# Patient Record
Sex: FEMALE | Race: WHITE | NOT HISPANIC OR LATINO | Employment: FULL TIME | URBAN - METROPOLITAN AREA
[De-identification: names, ages, dates, MRNs, and addresses within clinical notes are randomized per-mention and may not be internally consistent; named-entity substitution may affect disease eponyms.]

---

## 2024-04-09 PROBLEM — Z87.891 PERSONAL HISTORY OF TOBACCO USE, PRESENTING HAZARDS TO HEALTH: Status: ACTIVE | Noted: 2024-04-09

## 2024-04-16 ENCOUNTER — CLINICAL SUPPORT (OUTPATIENT)
Dept: SMOKING CESSATION | Facility: CLINIC | Age: 54
End: 2024-04-16

## 2024-04-16 DIAGNOSIS — F17.210 MODERATE CIGARETTE SMOKER (10-19 PER DAY): Primary | ICD-10-CM

## 2024-04-16 RX ORDER — ROSUVASTATIN CALCIUM 10 MG/1
10 TABLET, COATED ORAL DAILY
COMMUNITY

## 2024-04-16 RX ORDER — CANDESARTAN 4 MG/1
4 TABLET ORAL DAILY
COMMUNITY

## 2024-04-16 RX ORDER — SEMAGLUTIDE 1.34 MG/ML
INJECTION, SOLUTION SUBCUTANEOUS
COMMUNITY

## 2024-04-16 RX ORDER — MELOXICAM 15 MG/1
15 TABLET ORAL DAILY
COMMUNITY
End: 2024-06-17 | Stop reason: ALTCHOICE

## 2024-04-16 NOTE — Clinical Note
Patient currently smoking a pack per day and will begin 1:1 cessation therapy with [facility]. Patient will remain on previously prescribed tobacco cessation medication regimen of 150mg bupropion once daily as directed.

## 2024-04-30 ENCOUNTER — CLINICAL SUPPORT (OUTPATIENT)
Dept: SMOKING CESSATION | Facility: CLINIC | Age: 54
End: 2024-04-30

## 2024-04-30 DIAGNOSIS — F17.210 MODERATE CIGARETTE SMOKER (10-19 PER DAY): Primary | ICD-10-CM

## 2024-04-30 RX ORDER — IBUPROFEN 200 MG
1 TABLET ORAL DAILY
Qty: 14 PATCH | Refills: 0 | Status: SHIPPED | OUTPATIENT
Start: 2024-04-30 | End: 2024-05-28 | Stop reason: SDUPTHER

## 2024-04-30 NOTE — Clinical Note
Patient currently smoking a pack/day; pt has discontinued use of 150mg bupropion once daily, due to side effects, about 4-5 days ago, discussed alternate cessation medication options; pt will begin 21mg nicotine patch daily as directed; completion of TCRS (Tobacco Cessation Rating Scale) learned addiction model, cues/triggers, personal reasons for quitting, medications, and goals; discussed next steps in quit plan

## 2024-05-14 ENCOUNTER — CLINICAL SUPPORT (OUTPATIENT)
Dept: SMOKING CESSATION | Facility: CLINIC | Age: 54
End: 2024-05-14

## 2024-05-14 DIAGNOSIS — F17.210 LIGHT CIGARETTE SMOKER (1-9 CIGS/DAY): Primary | ICD-10-CM

## 2024-05-14 NOTE — PROGRESS NOTES
Individual Follow-Up Form    5/14/2024    Quit Date:     Clinical Status of Patient: Outpatient    Length of Service: 45 minutes    Continuing Medication: yes  Patches    Other Medications:      Target Symptoms: Withdrawal and medication side effects. The following were rated moderate (3) to severe (4) on TCRS:  Moderate (3): none  Severe (4): none    Comments: 15 cig/day, down from a pack/day; pt has not been able to start nicotine patches as of today's visit due to issues with the pharmacy, pt states she will pick them up today and begin use tomorrow; Completion of TCRS (Tobacco Cessation Rating Scale) reviewed strategies, cues, and triggers. Introduced the negative impact of tobacco on health, the health advantages of discontinuing the use of tobacco, time line improved health changes after a quit, withdrawal issues to expect from nicotine and habit, and ways to achieve the goal of a quit; commended pt on progress this far and discussed next steps in quit plan       Diagnosis: F17.210    Next Visit: 2 weeks

## 2024-05-14 NOTE — Clinical Note
15 cig/day, down from a pack/day; pt has not been able to start nicotine patches as of today's visit due to issues with the pharmacy, pt states she will pick them up today and begin use tomorrow; Completion of TCRS (Tobacco Cessation Rating Scale) reviewed strategies, cues, and triggers. Introduced the negative impact of tobacco on health, the health advantages of discontinuing the use of tobacco, time line improved health changes after a quit, withdrawal issues to expect from nicotine and habit, and ways to achieve the goal of a quit; commended pt on progress this far and discussed next steps in quit plan

## 2024-05-28 ENCOUNTER — CLINICAL SUPPORT (OUTPATIENT)
Dept: SMOKING CESSATION | Facility: CLINIC | Age: 54
End: 2024-05-28

## 2024-05-28 DIAGNOSIS — F17.210 MODERATE CIGARETTE SMOKER (10-19 PER DAY): Primary | ICD-10-CM

## 2024-05-28 RX ORDER — IBUPROFEN 200 MG
1 TABLET ORAL DAILY
Qty: 14 PATCH | Refills: 0 | Status: SHIPPED | OUTPATIENT
Start: 2024-05-28

## 2024-05-28 NOTE — PROGRESS NOTES
Individual Follow-Up Form    5/28/2024    Quit Date:     Clinical Status of Patient: Outpatient    Length of Service: 45 minutes    Continuing Medication: yes  Patches    Other Medications:      Target Symptoms: Withdrawal and medication side effects. The following were rated moderate (3) to severe (4) on TCRS:  Moderate (3): desire/crave tobacco; reviewed with pt  Severe (4): none    Comments: 15 cig/day, down from a pack/day; pt remains on 21mg nicotine patch, without any negative side effects at this time; pt states she will remove patch when she wants to smoke, discussed desire to quit and the importance of proper use of nicotine patches, pt agreed to increase compliance by not removing patches; completion of TCRS (Tobacco Cessation Rating Scale) reviewed strategies, controlling environment, cues, triggers, new goals set; introduced high risk situations with preparation interventions, caffeine similarities with withdrawal issues of habit and nicotine, Alcohol, Understanding urges, cravings, stress and relaxation; commended pt on progress this far and discussed next steps in quit plan     Diagnosis: F17.210    Next Visit: 2 weeks

## 2024-05-28 NOTE — Clinical Note
15 cig/day, down from a pack/day; pt remains on 21mg nicotine patch, without any negative side effects at this time; pt states she will remove patch when she wants to smoke, discussed desire to quit and the importance of proper use of nicotine patches, pt agreed to increase compliance by not removing patches; completion of TCRS (Tobacco Cessation Rating Scale) reviewed strategies, controlling environment, cues, triggers, new goals set; introduced high risk situations with preparation interventions, caffeine similarities with withdrawal issues of habit and nicotine, Alcohol, Understanding urges, cravings, stress and relaxation; commended pt on progress this far and discussed next steps in quit plan

## 2024-06-17 PROBLEM — J45.40 MODERATE PERSISTENT ASTHMA: Status: ACTIVE | Noted: 2024-06-17

## 2024-07-02 ENCOUNTER — CLINICAL SUPPORT (OUTPATIENT)
Dept: SMOKING CESSATION | Facility: CLINIC | Age: 54
End: 2024-07-02

## 2024-07-02 DIAGNOSIS — F17.210 MODERATE CIGARETTE SMOKER (10-19 PER DAY): Primary | ICD-10-CM

## 2024-07-02 NOTE — PROGRESS NOTES
Individual Follow-Up Form    7/2/2024    Quit Date:       Clinical Status of Patient: Outpatient    Length of Service: 45 minutes    Continuing Medication: yes  Wellbutrin or Patches    Other Medications:      Target Symptoms: Withdrawal and medication side effects. The following were rated moderate (3) to severe (4) on TCRS:  Moderate (3): desire/crave tobacco, headaches; reviewed with pt  Severe (4): none    Comments: 12-15 cig/day, down from a pack/day; pt remains on 21mg nicotine patch (purchased OTC), without any negative side effects at this time; pt is removing patch at night due to trouble sleeping with patches on; pt has resumed use of 150mg bupropion in AM, skipping PM dose due to sleep disturbance; completion of TCRS (Tobacco Cessation Rating Scale) reviewed strategies, habitual behavior, stress, and high risk situations. Introduced stress with addition interventions, relaxation with interventions, and the importance of rewarding oneself for accomplishments toward becoming tobacco free; commended pt on progress this far and discussed next steps in quit plan         Diagnosis: F17.210    Next Visit: 2 weeks

## 2024-07-02 NOTE — Clinical Note
12-15 cig/day, down from a pack/day; pt remains on 21mg nicotine patch (purchased OTC), without any negative side effects at this time; pt is removing patch at night due to trouble sleeping with patches on; pt has resumed use of 150mg bupropion in AM, skipping PM dose due to sleep disturbance; completion of TCRS (Tobacco Cessation Rating Scale) reviewed strategies, habitual behavior, stress, and high risk situations. Introduced stress with addition interventions, relaxation with interventions, and the importance of rewarding oneself for accomplishments toward becoming tobacco free; commended pt on progress this far and discussed next steps in quit plan

## 2024-07-22 ENCOUNTER — CLINICAL SUPPORT (OUTPATIENT)
Dept: SMOKING CESSATION | Facility: CLINIC | Age: 54
End: 2024-07-22

## 2024-07-22 DIAGNOSIS — F17.210 MODERATE CIGARETTE SMOKER (10-19 PER DAY): Primary | ICD-10-CM

## 2024-07-22 NOTE — Clinical Note
12-15 cig/day, down from a pack/day; pt is no longer wearing 21mg nicotine patch (purchased OTC), remains on 150mg bupropion in AM, skipping PM dose due to sleep disturbance; completion of TCRS (Tobacco Cessation Rating Scale) reviewed strategies, cues, triggers, high risk situations, lapses, relapses, diet, exercise, stress, relaxation, sleep, habitual behavior, and life style changes; discussed motivation to quit and pt is not completely committed to quit attempt at this time, will call when ready; commended pt on progress this far and discussed next steps in quit plan

## 2024-07-22 NOTE — PROGRESS NOTES
Individual Follow-Up Form    7/22/2024    Quit Date:     Clinical Status of Patient: Outpatient    Length of Service: 45 minutes    Continuing Medication: no    Other Medications:      Target Symptoms: Withdrawal and medication side effects. The following were rated moderate (3) to severe (4) on TCRS:  Moderate (3): desire/crave tobacco; reviewed with pt  Severe (4): none    Comments: 12-15 cig/day, down from a pack/day; pt is no longer wearing 21mg nicotine patch (purchased OTC), remains on 150mg bupropion in AM, skipping PM dose due to sleep disturbance; completion of TCRS (Tobacco Cessation Rating Scale) reviewed strategies, cues, triggers, high risk situations, lapses, relapses, diet, exercise, stress, relaxation, sleep, habitual behavior, and life style changes; discussed motivation to quit and pt is not completely committed to quit attempt at this time, will call when ready; commended pt on progress this far and discussed next steps in quit plan        Diagnosis: F17.210    Next Visit:

## 2024-07-31 ENCOUNTER — TELEPHONE (OUTPATIENT)
Dept: SMOKING CESSATION | Facility: CLINIC | Age: 54
End: 2024-07-31
Payer: COMMERCIAL

## 2024-09-18 PROBLEM — J32.9 CHRONIC SINUSITIS: Status: ACTIVE | Noted: 2024-09-18

## 2024-10-17 ENCOUNTER — CLINICAL SUPPORT (OUTPATIENT)
Dept: SMOKING CESSATION | Facility: CLINIC | Age: 54
End: 2024-10-17

## 2024-10-17 DIAGNOSIS — F17.200 NICOTINE DEPENDENCE: Primary | ICD-10-CM

## 2024-10-17 PROCEDURE — 99999 PR PBB SHADOW E&M-EST. PATIENT-LVL I: CPT | Mod: PBBFAC,,,

## 2024-10-17 NOTE — PROGRESS NOTES
Spoke with patient today in regard to smoking cessation progress for 6 month telephone follow up, she states not tobacco free.  Patient states not interested in working on quitting smoking at this time.  Informed patient of benefit period, future follow up, and contact information if any further help or support is needed.  Will complete smart form for 3/6 month follow up on Quit attempt #1.